# Patient Record
Sex: MALE | Race: BLACK OR AFRICAN AMERICAN | Employment: OTHER | URBAN - METROPOLITAN AREA
[De-identification: names, ages, dates, MRNs, and addresses within clinical notes are randomized per-mention and may not be internally consistent; named-entity substitution may affect disease eponyms.]

---

## 2023-03-15 ENCOUNTER — HOSPITAL ENCOUNTER (OUTPATIENT)
Dept: PREADMISSION TESTING | Age: 68
Discharge: HOME | End: 2023-03-15
Payer: MEDICARE

## 2023-03-15 VITALS
SYSTOLIC BLOOD PRESSURE: 142 MMHG | HEART RATE: 43 BPM | OXYGEN SATURATION: 100 % | WEIGHT: 204.8 LBS | RESPIRATION RATE: 16 BRPM | DIASTOLIC BLOOD PRESSURE: 78 MMHG | HEIGHT: 71 IN | BODY MASS INDEX: 28.67 KG/M2 | TEMPERATURE: 97.7 F

## 2023-03-15 LAB
ABSOLUTE EOS #: 0.47 K/UL (ref 0–0.44)
ABSOLUTE IMMATURE GRANULOCYTE: 0.02 K/UL (ref 0–0.3)
ABSOLUTE LYMPH #: 2.39 K/UL (ref 1.1–3.7)
ABSOLUTE MONO #: 1.03 K/UL (ref 0.1–1.2)
ANION GAP SERPL CALCULATED.3IONS-SCNC: 12 MMOL/L (ref 9–17)
BASOPHILS # BLD: 1 % (ref 0–2)
BASOPHILS ABSOLUTE: 0.06 K/UL (ref 0–0.2)
BILIRUBIN URINE: NEGATIVE
BUN SERPL-MCNC: 16 MG/DL (ref 8–23)
BUN/CREAT BLD: 11 (ref 9–20)
CALCIUM SERPL-MCNC: 9.3 MG/DL (ref 8.6–10.4)
CHLORIDE SERPL-SCNC: 100 MMOL/L (ref 98–107)
CO2 SERPL-SCNC: 28 MMOL/L (ref 20–31)
COLOR: YELLOW
COMMENT UA: NORMAL
CREAT SERPL-MCNC: 1.44 MG/DL (ref 0.7–1.2)
EOSINOPHILS RELATIVE PERCENT: 6 % (ref 1–4)
GFR SERPL CREATININE-BSD FRML MDRD: 53 ML/MIN/1.73M2
GLUCOSE SERPL-MCNC: 104 MG/DL (ref 70–99)
GLUCOSE UR STRIP.AUTO-MCNC: NEGATIVE MG/DL
HCT VFR BLD AUTO: 45.4 % (ref 40.7–50.3)
HGB BLD-MCNC: 15.1 G/DL (ref 13–17)
IMMATURE GRANULOCYTES: 0 %
INR PPP: 1
KETONES UR STRIP.AUTO-MCNC: NEGATIVE MG/DL
LEUKOCYTE ESTERASE UR QL STRIP.AUTO: NEGATIVE
LYMPHOCYTES # BLD: 30 % (ref 24–43)
MCH RBC QN AUTO: 26.8 PG (ref 25.2–33.5)
MCHC RBC AUTO-ENTMCNC: 33.3 G/DL (ref 28.4–34.8)
MCV RBC AUTO: 80.6 FL (ref 82.6–102.9)
MONOCYTES # BLD: 13 % (ref 3–12)
NITRITE UR QL STRIP.AUTO: NEGATIVE
NRBC AUTOMATED: 0 PER 100 WBC
PARTIAL THROMBOPLASTIN TIME: 28.7 SEC (ref 23.9–33.8)
PDW BLD-RTO: 13.4 % (ref 11.8–14.4)
PLATELET # BLD AUTO: 242 K/UL (ref 138–453)
PMV BLD AUTO: 10.3 FL (ref 8.1–13.5)
POTASSIUM SERPL-SCNC: 3.6 MMOL/L (ref 3.7–5.3)
PROT UR STRIP.AUTO-MCNC: 7 MG/DL (ref 5–8)
PROT UR STRIP.AUTO-MCNC: NEGATIVE MG/DL
PROTHROMBIN TIME: 13.2 SEC (ref 11.5–14.2)
RBC # BLD: 5.63 M/UL (ref 4.21–5.77)
RBC # BLD: ABNORMAL 10*6/UL
SEG NEUTROPHILS: 50 % (ref 36–65)
SEGMENTED NEUTROPHILS ABSOLUTE COUNT: 3.92 K/UL (ref 1.5–8.1)
SODIUM SERPL-SCNC: 140 MMOL/L (ref 135–144)
SPECIFIC GRAVITY UA: 1.01 (ref 1–1.03)
TURBIDITY: CLEAR
URINE HGB: NEGATIVE
UROBILINOGEN, URINE: NORMAL
WBC # BLD AUTO: 7.9 K/UL (ref 3.5–11.3)

## 2023-03-15 PROCEDURE — 85025 COMPLETE CBC W/AUTO DIFF WBC: CPT

## 2023-03-15 PROCEDURE — 81003 URINALYSIS AUTO W/O SCOPE: CPT

## 2023-03-15 PROCEDURE — 93005 ELECTROCARDIOGRAM TRACING: CPT | Performed by: ORTHOPAEDIC SURGERY

## 2023-03-15 PROCEDURE — 85610 PROTHROMBIN TIME: CPT

## 2023-03-15 PROCEDURE — 36415 COLL VENOUS BLD VENIPUNCTURE: CPT

## 2023-03-15 PROCEDURE — 85730 THROMBOPLASTIN TIME PARTIAL: CPT

## 2023-03-15 PROCEDURE — 80048 BASIC METABOLIC PNL TOTAL CA: CPT

## 2023-03-15 PROCEDURE — 87086 URINE CULTURE/COLONY COUNT: CPT

## 2023-03-15 PROCEDURE — 87641 MR-STAPH DNA AMP PROBE: CPT

## 2023-03-15 RX ORDER — M-VIT,TX,IRON,MINS/CALC/FOLIC 27MG-0.4MG
1 TABLET ORAL DAILY
COMMUNITY

## 2023-03-15 RX ORDER — OMEPRAZOLE 20 MG/1
20 CAPSULE, DELAYED RELEASE ORAL DAILY
COMMUNITY

## 2023-03-15 RX ORDER — ALLOPURINOL 100 MG/1
100 TABLET ORAL DAILY
COMMUNITY

## 2023-03-15 RX ORDER — LOSARTAN POTASSIUM 100 MG/1
100 TABLET ORAL DAILY
COMMUNITY

## 2023-03-15 RX ORDER — ICOSAPENT ETHYL 1000 MG/1
2 CAPSULE ORAL 2 TIMES DAILY
COMMUNITY

## 2023-03-15 RX ORDER — TIZANIDINE 4 MG/1
4 TABLET ORAL EVERY 6 HOURS PRN
COMMUNITY

## 2023-03-15 RX ORDER — CETIRIZINE HYDROCHLORIDE 10 MG/1
10 TABLET ORAL DAILY
COMMUNITY

## 2023-03-15 RX ORDER — SILDENAFIL CITRATE 20 MG/1
20 TABLET ORAL PRN
COMMUNITY

## 2023-03-15 RX ORDER — CHLORTHALIDONE 25 MG/1
25 TABLET ORAL DAILY
COMMUNITY

## 2023-03-15 ASSESSMENT — PAIN DESCRIPTION - DESCRIPTORS: DESCRIPTORS: ACHING

## 2023-03-15 ASSESSMENT — PAIN SCALES - GENERAL: PAINLEVEL_OUTOF10: 0

## 2023-03-15 ASSESSMENT — PAIN DESCRIPTION - LOCATION: LOCATION: BACK

## 2023-03-15 ASSESSMENT — PAIN DESCRIPTION - ORIENTATION: ORIENTATION: MID

## 2023-03-15 NOTE — PRE-PROCEDURE INSTRUCTIONS
[facility] Wednesday[facility] 4[facility]5/23 at 7:50 AM    Once you enter the hospital lobby, take the elevators to the second floor. Check-In is at the surgery registration desk. Continue to take your home medications as you normally do up to and including the night before surgery with the exception of any blood thinning medications. Please stop any blood thinning medications as directed by your surgeon or prescribing physician. Failure to stop certain medications may interfere with your scheduled surgery. These may include:  Aspirin, Warfarin (Coumadin), Clopidogrel (Plavix), Ibuprofen (Motrin, Advil), Naproxen (Aleve), Meloxicam (Mobic), Celecoxib (Celebrex), Eliquis, Pradaxa, Xarelto, Effient, Fish Oil, Herbal supplements. Stop MVI, may Take Tylenol for pain. If you are diabetic, do not take any of your diabetic medications by mouth the morning of surgery. If you are taking insulin contact the doctor that manages your diabetes for instructions about any changes to your insulin dosages the day before surgery. Do not inject insulin or other injectable diabetic medications the morning of surgery unless otherwise instructed by the doctor who manages your diabetes. Please take the following medication(s) the day of surgery with a small sip of water:  Omeprazole    Please use your inhalers at home the day of surgery. PREPARING FOR YOUR SURGERY:     Before surgery, you can play an important role in your own health. Because skin is not sterile, we need to be sure that your skin is as free of germs as possible before surgery by carefully washing before surgery. Preparing or prepping skin before surgery can reduce the risk of a surgical site infection.   Do not shave the area of your body where your surgery will be performed unless you received specific permission from your physician.     You will need to shower at home the night before surgery and the morning of surgery with a special soap called chlorhexidine gluconate (CHG*). *Not to be used by people allergic to Chlorhexidine Gluconate (CHG). Following these instructions will help you be sure that your skin is clean before surgery. Instructions on cleaning your skin before surgery: The night before your surgery: You will need to shower with warm water (not hot) and the CHG soap. Use a clean wash cloth and a clean towel. Have clean clothes available to put on after the shower. First wash your hair with regular shampoo. Rinse your hair and body thoroughly to remove the shampoo. Wash your face and genital area (private parts) with your regular soap or water only. Thoroughly rinse your body with warm water from the neck down. Turn water off to prevent rinsing the soap off too soon. With a clean wet washcloth and half of the CHG soap in the bottle, lather your entire body from the neck down. Do not use CHG soap near your eyes or ears to avoid injury to those areas. Wash thoroughly, paying special attention to the area where your surgery will be performed. Wash your body gently for five (5) minutes. Avoid scrubbing your skin too hard. Turn the water back on and rinse your body thoroughly. Pat yourself dry with a clean, soft towel. Do not apply lotion, cream or powder. Dress with clean freshly washed clothes. The morning of surgery:    Repeat shower following steps above - using remaining half of CHG soap in bottle. Patient Instructions: If you are having any type of anesthesia you are to have nothing to eat or drink after midnight the night before your surgery. This includes gum, hard candy, mints, water or smoking or chewing tobacco.  The only exception to this is a small sip of water to take with any morning dose of heart, blood pressure, or seizure medications. No alcoholic beverages for 24 hours prior to surgery. Brush your teeth but do not swallow water.     Bring your eyeglasses and case with you. No contacts are to be worn the day of surgery. You also may bring your hearing aids. If you are on C-PAP or Bi-PAP at home and plan on staying in the hospital overnight for your surgery please bring the machine with you. Do not wear any jewelry or body piercings day of surgery. Also, NO lotion, perfume or deodorant to be used the day of surgery. No nail polish on the operative extremity (arm/leg surgeries)     If you are staying overnight with us, please bring a small bag of personal items. Please wear loose, comfortable clothing. If you are potentially going to have a cast or brace bring clothing that will fit over them. In case of illness - If you have cold or flu like symptoms (high fever, runny nose, sore throat, cough, etc.) rash, nausea, vomiting, loose stools, and/or recent contact with someone who has a contagious disease (chicken pox, measles, etc.) Please call your doctor before coming to the hospital.    If your child is having surgery please make arrangements for any other children to be cared for at home on the day of surgery. Other children are not permitted in recovery room and we want you to be able to spend time with the patient. If other arrangements are not available then we suggest that you have a second adult to stay in the waiting room. Day of Surgery/Procedure:    As a patient at [facility] - INPATIENT you can expect quality medical and nursing care that is centered on your individual needs. Our goal is to make your surgical experience as comfortable as possible    . Transportation After Your Surgery/Procedure: You will need a friend or family member to drive you home after your procedure. Your  must be 25years of age or older and able to sign off on your discharge instructions.   A taxi cab or any other form of public transportation is not acceptable. Your friend or family member must stay at the hospital throughout your procedure. Someone must remain with you for the first 24 hours after your surgery if you receive anesthesia or medication. If you do not have someone to stay with you, your procedure may be cancelled.       If you have any other questions regarding your procedure or the day of surgery, please call [phone]      _________________________  ____________________________  Signature (Patient)              Signature (Provider) & date

## 2023-03-15 NOTE — PROGRESS NOTES
PAT Progress Note    Pt Name: James Wilson  MRN: [id number]  YOB: 1955  Date of evaluation: 3/15/2023      [x] Called to PAT. I spoke to the patient, James Wilson, a 75 y.o. male, who is scheduled for an upcoming LEFT L3-L5 LUMBAR TRANSFORMINAL INTERBODY FUSION POSTERIOR TWO LEVELS - [facility]   2 C-ARMS  POSSIBLE COMPUTER NAVIGATION by Suzanne Jones MD for Spinal stenosis of lumbar region, unspecified whether neurogenic claudication present John Rodrigues on 04/05/2023 at 0950. [x] The patient has an upcoming PCP appointment on 03/16/2023, which can be used for an Interval History and Physical Note the day of surgery. Patient follows with Dr. Frederick Smith nephrology for management of chronic kidney disease and was last seen in November 2022. Also sees Dr. Anderson Rogers and was seen at the beginning of this month. Functional Capacity per pt:  1) Pt is able to walk 2 city blocks on level ground without SOB. 2) Pt is able to climb 2 flights of stairs without SOB. 3) Pt is able to walk up a hill for 1-2 city blocks without SOB. Patient used to run regularly but has had to cut down due to back pain. He is still active and jogs when he can as well as frequent walks. Vital signs: BP (!) 142/78   Pulse (!) 43   Temp 97.7 °F (36.5 °C) (Temporal)   Resp 16   Ht 5' 11\" (1.803 m)   Wt 204 lb 12.8 oz (92.9 kg)   SpO2 100%   BMI 28.56 kg/m²     Physical Exam:     General Appearance:  Alert, well appearing, and in no acute distress. Mental status: Oriented to person, place, and time. Lungs: Bilateral equal air entry, clear to auscultation, no wheezing, rales or rhonchi, and normal effort. Cardiovascular: Asymptomatic bradycardic rate. Apical pulse 52 bpm. Regular rhythm, no murmur, gallop, or rub. Abdomen: Soft, nontender, nondistended, and active bowel sounds.     Past Medical History:     Past Medical History:   Diagnosis Date    Arthritis     Chronic kidney disease (CKD)     follows with [facility] clinic Dr. Maryam Gonzáles.     GERD (gastroesophageal reflux disease)     Gout     Hyperlipidemia     Hypertension         Past Surgical History:     Past Surgical History:   Procedure Laterality Date    ACHILLES TENDON SURGERY Left     COLONOSCOPY  2022    HERNIA REPAIR Left     KNEE ARTHROSCOPY Left     KNEE ARTHROSCOPY Left     KNEE ARTHROSCOPY Right     SHOULDER ARTHROSCOPY W/ ROTATOR CUFF REPAIR Right     SHOULDER ARTHROSCOPY W/ ROTATOR CUFF REPAIR Left     TONSILLECTOMY         Investigations:      Laboratory Testing:  Recent Results (from the past 24 hour(s))   CBC with Auto Differential    Collection Time: 03/15/23  8:45 AM   Result Value Ref Range    WBC 7.9 3.5 - 11.3 k/uL    RBC 5.63 4.21 - 5.77 m/uL    Hemoglobin 15.1 13.0 - 17.0 g/dL    Hematocrit 45.4 40.7 - 50.3 %    MCV 80.6 (L) 82.6 - 102.9 fL    MCH 26.8 25.2 - 33.5 pg    MCHC 33.3 28.4 - 34.8 g/dL    RDW 13.4 11.8 - 14.4 %    Platelets 515 884 - 438 k/uL    MPV 10.3 8.1 - 13.5 fL    NRBC Automated 0.0 0.0 per 100 WBC    Seg Neutrophils 50 36 - 65 %    Lymphocytes 30 24 - 43 %    Monocytes 13 (H) 3 - 12 %    Eosinophils % 6 (H) 1 - 4 %    Basophils 1 0 - 2 %    Immature Granulocytes 0 0 %    Segs Absolute 3.92 1.50 - 8.10 k/uL    Absolute Lymph # 2.39 1.10 - 3.70 k/uL    Absolute Mono # 1.03 0.10 - 1.20 k/uL    Absolute Eos # 0.47 (H) 0.00 - 0.44 k/uL    Basophils Absolute 0.06 0.00 - 0.20 k/uL    Absolute Immature Granulocyte 0.02 0.00 - 0.30 k/uL    RBC Morphology MICROCYTOSIS PRESENT    Protime-INR    Collection Time: 03/15/23  8:45 AM   Result Value Ref Range    Protime 13.2 11.5 - 14.2 sec    INR 1.0    APTT    Collection Time: 03/15/23  8:45 AM   Result Value Ref Range    PTT 28.7 23.9 - 33.8 sec   Basic Metabolic Panel    Collection Time: 03/15/23  8:45 AM   Result Value Ref Range    Glucose 104 (H) 70 - 99 mg/dL    BUN 16 8 - 23 mg/dL    Creatinine 1.44 (H) 0.70 - 1.20 mg/dL    Est, Glom Filt Rate 53 (L) >60 mL/min/1.73m2    Bun/Cre Ratio 11 9 - 20    Calcium 9.3 8.6 - 10.4 mg/dL    Sodium 140 135 - 144 mmol/L    Potassium 3.6 (L) 3.7 - 5.3 mmol/L    Chloride 100 98 - 107 mmol/L    CO2 28 20 - 31 mmol/L    Anion Gap 12 9 - 17 mmol/L   Urinalysis    Collection Time: 03/15/23  8:56 AM   Result Value Ref Range    Color, UA Yellow Yellow    Turbidity UA Clear Clear    Glucose, Ur NEGATIVE NEGATIVE    Bilirubin Urine NEGATIVE NEGATIVE    Ketones, Urine NEGATIVE NEGATIVE    Specific Gravity, UA 1.007 1.005 - 1.030    Urine Hgb NEGATIVE NEGATIVE    pH, UA 7.0 5.0 - 8.0    Protein, UA NEGATIVE NEGATIVE    Urobilinogen, Urine Normal Normal    Nitrite, Urine NEGATIVE NEGATIVE    Leukocyte Esterase, Urine NEGATIVE NEGATIVE    Urinalysis Comments       Microscopic exam not performed based on chemical results unless requested in original order. Recent Labs     03/15/23  0845   HGB 15.1   HCT 45.4   WBC 7.9   MCV 80.6*      K 3.6*      CO2 28   BUN 16   CREATININE 1.44*   GLUCOSE 104*   INR 1.0   PROTIME 13.2   APTT 28.7       No results for input(s): COVID19 in the last 720 hours.     MARYCRUZ Spivey CNP    Electronically signed 3/15/2023 at 9:45 AM

## 2023-03-16 LAB
EKG ATRIAL RATE: 54 BPM
EKG P AXIS: 36 DEGREES
EKG P-R INTERVAL: 198 MS
EKG Q-T INTERVAL: 474 MS
EKG QRS DURATION: 96 MS
EKG QTC CALCULATION (BAZETT): 449 MS
EKG R AXIS: -24 DEGREES
EKG T AXIS: 20 DEGREES
EKG VENTRICULAR RATE: 54 BPM
MICROORGANISM SPEC CULT: NO GROWTH
MRSA, DNA, NASAL: NEGATIVE
SPECIMEN DESCRIPTION: NORMAL
SPECIMEN DESCRIPTION: NORMAL

## 2023-03-16 PROCEDURE — 93010 ELECTROCARDIOGRAM REPORT: CPT | Performed by: INTERNAL MEDICINE

## 2023-04-04 NOTE — DISCHARGE INSTRUCTIONS
No bending or twisting back for 12 weeks  No lifting over 15 pounds for 12 weeks  Dry dressing changes daily x 1wk  Brace on when up walking. Ok to have off sitting and in bed. No NSAIDS x 6wks  Stiches are dissolvable and do not need to be removed  Call for any fevers, wound redness, swelling or drainage after 4 days [phone]  May shower in 2 days      MD [facility]  Spine Surgeon  [phone]    Keep it Clean - [facility] instructions    These instructions are to help you have the best possible recovery after your surgical procedure. Becky Sotelo is here to support you. If you have questions, call [phone] Monday through Friday from 7:30AM to 8:30PM to speak to a nurse. If you need to speak to someone outside of these hours, call your physician. Incision Dos and Donts  Do wash hands before and after dressing changes or when you have had any contact with your incision. Use hand  or antibacterial soap. Do keep your incision clean and dry. Its OK to wash the skin around your incision with mild soap and water. Do change your dressing as you were told. Do notify your doctor if the dressing becomes wet or dirty. Do use a clean washcloth every time when cleaning your incision. Do sleep on clean linens. Do keep pets away from incision site. Dont sit in a bathtub, pool, or hot tub until your incision is fully closed and any drains are removed. Dont scrub, pick, scratch, or pull at your incision. Dont use oils, lotions, or creams on your incision unless your healthcare provider approves it. Follow-up  You will have one or more follow-up visits with your healthcare provider. These are needed to check how well youre healing. Your drain, stitches, or staples may also be removed during these visits. Do not miss your follow-up visit, even if you are feeling better.       Call your healthcare provider right away if you have the following:  Fever

## 2023-04-05 ENCOUNTER — HOSPITAL ENCOUNTER (INPATIENT)
Age: 68
LOS: 1 days | Discharge: HOME | End: 2023-04-06
Attending: ORTHOPAEDIC SURGERY | Admitting: ORTHOPAEDIC SURGERY
Payer: MEDICARE

## 2023-04-05 ENCOUNTER — ANESTHESIA EVENT (OUTPATIENT)
Dept: OPERATING ROOM | Age: 68
End: 2023-04-05
Payer: MEDICARE

## 2023-04-05 ENCOUNTER — ANESTHESIA (OUTPATIENT)
Dept: OPERATING ROOM | Age: 68
End: 2023-04-05
Payer: MEDICARE

## 2023-04-05 ENCOUNTER — APPOINTMENT (OUTPATIENT)
Dept: GENERAL RADIOLOGY | Age: 68
End: 2023-04-05
Attending: ORTHOPAEDIC SURGERY
Payer: MEDICARE

## 2023-04-05 DIAGNOSIS — M43.10 SPONDYLOLISTHESIS, ACQUIRED: Primary | Chronic | ICD-10-CM

## 2023-04-05 PROBLEM — M48.062 SPINAL STENOSIS, LUMBAR REGION WITH NEUROGENIC CLAUDICATION: Chronic | Status: ACTIVE | Noted: 2023-04-05

## 2023-04-05 PROCEDURE — 6370000000 HC RX 637 (ALT 250 FOR IP): Performed by: ORTHOPAEDIC SURGERY

## 2023-04-05 PROCEDURE — 2580000003 HC RX 258: Performed by: ANESTHESIOLOGY

## 2023-04-05 PROCEDURE — 7100000001 HC PACU RECOVERY - ADDTL 15 MIN: Performed by: ORTHOPAEDIC SURGERY

## 2023-04-05 PROCEDURE — C1713 ANCHOR/SCREW BN/BN,TIS/BN: HCPCS | Performed by: ORTHOPAEDIC SURGERY

## 2023-04-05 PROCEDURE — 3600000004 HC SURGERY LEVEL 4 BASE: Performed by: ORTHOPAEDIC SURGERY

## 2023-04-05 PROCEDURE — 3700000001 HC ADD 15 MINUTES (ANESTHESIA): Performed by: ORTHOPAEDIC SURGERY

## 2023-04-05 PROCEDURE — 6360000002 HC RX W HCPCS: Performed by: NURSE ANESTHETIST, CERTIFIED REGISTERED

## 2023-04-05 PROCEDURE — 3600000014 HC SURGERY LEVEL 4 ADDTL 15MIN: Performed by: ORTHOPAEDIC SURGERY

## 2023-04-05 PROCEDURE — 3209999900 FLUORO FOR SURGICAL PROCEDURES

## 2023-04-05 PROCEDURE — 3700000000 HC ANESTHESIA ATTENDED CARE: Performed by: ORTHOPAEDIC SURGERY

## 2023-04-05 PROCEDURE — A4217 STERILE WATER/SALINE, 500 ML: HCPCS | Performed by: ORTHOPAEDIC SURGERY

## 2023-04-05 PROCEDURE — 2500000003 HC RX 250 WO HCPCS: Performed by: NURSE ANESTHETIST, CERTIFIED REGISTERED

## 2023-04-05 PROCEDURE — 2720000010 HC SURG SUPPLY STERILE: Performed by: ORTHOPAEDIC SURGERY

## 2023-04-05 PROCEDURE — 2580000003 HC RX 258: Performed by: ORTHOPAEDIC SURGERY

## 2023-04-05 PROCEDURE — 2500000003 HC RX 250 WO HCPCS: Performed by: ORTHOPAEDIC SURGERY

## 2023-04-05 PROCEDURE — 6360000002 HC RX W HCPCS: Performed by: ANESTHESIOLOGY

## 2023-04-05 PROCEDURE — 1200000000 HC SEMI PRIVATE

## 2023-04-05 PROCEDURE — 6370000000 HC RX 637 (ALT 250 FOR IP): Performed by: ANESTHESIOLOGY

## 2023-04-05 PROCEDURE — C1889 IMPLANT/INSERT DEVICE, NOC: HCPCS | Performed by: ORTHOPAEDIC SURGERY

## 2023-04-05 PROCEDURE — 7100000000 HC PACU RECOVERY - FIRST 15 MIN: Performed by: ORTHOPAEDIC SURGERY

## 2023-04-05 PROCEDURE — 2709999900 HC NON-CHARGEABLE SUPPLY: Performed by: ORTHOPAEDIC SURGERY

## 2023-04-05 PROCEDURE — 6360000002 HC RX W HCPCS: Performed by: ORTHOPAEDIC SURGERY

## 2023-04-05 DEVICE — SCREW SPNL L45MM DIA6.5MM POLYAX 2C RELINE MAS: Type: IMPLANTABLE DEVICE | Site: BACK | Status: FUNCTIONAL

## 2023-04-05 DEVICE — SCREW SPNL MOD TULIP RELINE: Type: IMPLANTABLE DEVICE | Site: BACK | Status: FUNCTIONAL

## 2023-04-05 DEVICE — SCREW SPNL DIA5.5MM OPN TULIP LOK RELINE: Type: IMPLANTABLE DEVICE | Site: BACK | Status: FUNCTIONAL

## 2023-04-05 DEVICE — GRAFT BNE 30CC 1 4MM CANC CRUSH CHIP READIGRFT: Type: IMPLANTABLE DEVICE | Site: BACK | Status: FUNCTIONAL

## 2023-04-05 DEVICE — GRAFT BNE PTTY LG DEGENERATIVE BNE MTRX PROPEL: Type: IMPLANTABLE DEVICE | Site: BACK | Status: FUNCTIONAL

## 2023-04-05 DEVICE — ROD SPNL L70MM DIA5.5MM POST THORACOLUMBOSACRAL TI LORD: Type: IMPLANTABLE DEVICE | Site: BACK | Status: FUNCTIONAL

## 2023-04-05 DEVICE — CAGE SPNL 4 DEG 30X10X10 MM TLIF-O MODULUS: Type: IMPLANTABLE DEVICE | Site: BACK | Status: FUNCTIONAL

## 2023-04-05 DEVICE — SCREW SPNL L45MM DIA6.5MM POST THORACOLUMBOSACRAL MOD SHANK: Type: IMPLANTABLE DEVICE | Site: BACK | Status: FUNCTIONAL

## 2023-04-05 RX ORDER — SODIUM CHLORIDE, SODIUM LACTATE, POTASSIUM CHLORIDE, CALCIUM CHLORIDE 600; 310; 30; 20 MG/100ML; MG/100ML; MG/100ML; MG/100ML
INJECTION, SOLUTION INTRAVENOUS CONTINUOUS
Status: DISCONTINUED | OUTPATIENT
Start: 2023-04-05 | End: 2023-04-05

## 2023-04-05 RX ORDER — SODIUM CHLORIDE 0.9 % (FLUSH) 0.9 %
5-40 SYRINGE (ML) INJECTION EVERY 12 HOURS SCHEDULED
Status: DISCONTINUED | OUTPATIENT
Start: 2023-04-05 | End: 2023-04-05 | Stop reason: HOSPADM

## 2023-04-05 RX ORDER — SODIUM CHLORIDE 0.9 % (FLUSH) 0.9 %
5-40 SYRINGE (ML) INJECTION PRN
Status: DISCONTINUED | OUTPATIENT
Start: 2023-04-05 | End: 2023-04-05 | Stop reason: HOSPADM

## 2023-04-05 RX ORDER — SODIUM CHLORIDE 0.9 % (FLUSH) 0.9 %
5-40 SYRINGE (ML) INJECTION EVERY 12 HOURS SCHEDULED
Status: DISCONTINUED | OUTPATIENT
Start: 2023-04-05 | End: 2023-04-06 | Stop reason: HOSPADM

## 2023-04-05 RX ORDER — SENNA AND DOCUSATE SODIUM 50; 8.6 MG/1; MG/1
1 TABLET, FILM COATED ORAL 2 TIMES DAILY
Status: DISCONTINUED | OUTPATIENT
Start: 2023-04-05 | End: 2023-04-06 | Stop reason: HOSPADM

## 2023-04-05 RX ORDER — SODIUM CHLORIDE 0.9 % (FLUSH) 0.9 %
5-40 SYRINGE (ML) INJECTION PRN
Status: DISCONTINUED | OUTPATIENT
Start: 2023-04-05 | End: 2023-04-06 | Stop reason: HOSPADM

## 2023-04-05 RX ORDER — PROPOFOL 10 MG/ML
INJECTION, EMULSION INTRAVENOUS PRN
Status: DISCONTINUED | OUTPATIENT
Start: 2023-04-05 | End: 2023-04-05 | Stop reason: SDUPTHER

## 2023-04-05 RX ORDER — DEXAMETHASONE SODIUM PHOSPHATE 10 MG/ML
INJECTION, SOLUTION INTRAMUSCULAR; INTRAVENOUS PRN
Status: DISCONTINUED | OUTPATIENT
Start: 2023-04-05 | End: 2023-04-05 | Stop reason: SDUPTHER

## 2023-04-05 RX ORDER — LIDOCAINE HYDROCHLORIDE 10 MG/ML
1 INJECTION, SOLUTION EPIDURAL; INFILTRATION; INTRACAUDAL; PERINEURAL
Status: DISCONTINUED | OUTPATIENT
Start: 2023-04-05 | End: 2023-04-05 | Stop reason: HOSPADM

## 2023-04-05 RX ORDER — SODIUM CHLORIDE 9 MG/ML
INJECTION, SOLUTION INTRAVENOUS PRN
Status: DISCONTINUED | OUTPATIENT
Start: 2023-04-05 | End: 2023-04-06 | Stop reason: HOSPADM

## 2023-04-05 RX ORDER — ONDANSETRON 2 MG/ML
4 INJECTION INTRAMUSCULAR; INTRAVENOUS EVERY 6 HOURS PRN
Status: DISCONTINUED | OUTPATIENT
Start: 2023-04-05 | End: 2023-04-06 | Stop reason: HOSPADM

## 2023-04-05 RX ORDER — MORPHINE SULFATE 15 MG/1
15 TABLET, FILM COATED, EXTENDED RELEASE ORAL EVERY 12 HOURS SCHEDULED
Status: DISCONTINUED | OUTPATIENT
Start: 2023-04-05 | End: 2023-04-06 | Stop reason: HOSPADM

## 2023-04-05 RX ORDER — SUCCINYLCHOLINE/SOD CL,ISO/PF 100 MG/5ML
SYRINGE (ML) INTRAVENOUS PRN
Status: DISCONTINUED | OUTPATIENT
Start: 2023-04-05 | End: 2023-04-05 | Stop reason: SDUPTHER

## 2023-04-05 RX ORDER — ONDANSETRON 2 MG/ML
4 INJECTION INTRAMUSCULAR; INTRAVENOUS
Status: DISCONTINUED | OUTPATIENT
Start: 2023-04-05 | End: 2023-04-05 | Stop reason: HOSPADM

## 2023-04-05 RX ORDER — SODIUM CHLORIDE 9 MG/ML
INJECTION, SOLUTION INTRAVENOUS CONTINUOUS
Status: DISCONTINUED | OUTPATIENT
Start: 2023-04-05 | End: 2023-04-06 | Stop reason: HOSPADM

## 2023-04-05 RX ORDER — ALLOPURINOL 100 MG/1
100 TABLET ORAL DAILY
Status: DISCONTINUED | OUTPATIENT
Start: 2023-04-05 | End: 2023-04-06 | Stop reason: HOSPADM

## 2023-04-05 RX ORDER — HYDROMORPHONE HCL 110MG/55ML
PATIENT CONTROLLED ANALGESIA SYRINGE INTRAVENOUS PRN
Status: DISCONTINUED | OUTPATIENT
Start: 2023-04-05 | End: 2023-04-05 | Stop reason: SDUPTHER

## 2023-04-05 RX ORDER — LOSARTAN POTASSIUM 100 MG/1
100 TABLET ORAL DAILY
Status: DISCONTINUED | OUTPATIENT
Start: 2023-04-06 | End: 2023-04-06 | Stop reason: HOSPADM

## 2023-04-05 RX ORDER — PROMETHAZINE HYDROCHLORIDE 12.5 MG/1
12.5 TABLET ORAL EVERY 6 HOURS PRN
Status: DISCONTINUED | OUTPATIENT
Start: 2023-04-05 | End: 2023-04-06 | Stop reason: HOSPADM

## 2023-04-05 RX ORDER — SODIUM CHLORIDE 9 MG/ML
INJECTION, SOLUTION INTRAVENOUS CONTINUOUS
Status: DISCONTINUED | OUTPATIENT
Start: 2023-04-05 | End: 2023-04-05

## 2023-04-05 RX ORDER — KETAMINE HCL IN NACL, ISO-OSM 100MG/10ML
SYRINGE (ML) INJECTION PRN
Status: DISCONTINUED | OUTPATIENT
Start: 2023-04-05 | End: 2023-04-05 | Stop reason: SDUPTHER

## 2023-04-05 RX ORDER — LIDOCAINE HYDROCHLORIDE 20 MG/ML
INJECTION, SOLUTION EPIDURAL; INFILTRATION; INTRACAUDAL; PERINEURAL PRN
Status: DISCONTINUED | OUTPATIENT
Start: 2023-04-05 | End: 2023-04-05 | Stop reason: SDUPTHER

## 2023-04-05 RX ORDER — FENTANYL CITRATE 50 UG/ML
25 INJECTION, SOLUTION INTRAMUSCULAR; INTRAVENOUS EVERY 5 MIN PRN
Status: DISCONTINUED | OUTPATIENT
Start: 2023-04-05 | End: 2023-04-05 | Stop reason: HOSPADM

## 2023-04-05 RX ORDER — MIDAZOLAM HYDROCHLORIDE 1 MG/ML
INJECTION INTRAMUSCULAR; INTRAVENOUS PRN
Status: DISCONTINUED | OUTPATIENT
Start: 2023-04-05 | End: 2023-04-05 | Stop reason: SDUPTHER

## 2023-04-05 RX ORDER — SODIUM CHLORIDE 9 MG/ML
INJECTION, SOLUTION INTRAVENOUS PRN
Status: DISCONTINUED | OUTPATIENT
Start: 2023-04-05 | End: 2023-04-05 | Stop reason: HOSPADM

## 2023-04-05 RX ORDER — TIZANIDINE 4 MG/1
4 TABLET ORAL EVERY 8 HOURS PRN
Status: DISCONTINUED | OUTPATIENT
Start: 2023-04-05 | End: 2023-04-06 | Stop reason: HOSPADM

## 2023-04-05 RX ORDER — PANTOPRAZOLE SODIUM 40 MG/1
40 TABLET, DELAYED RELEASE ORAL
Status: DISCONTINUED | OUTPATIENT
Start: 2023-04-06 | End: 2023-04-06 | Stop reason: HOSPADM

## 2023-04-05 RX ORDER — DEXAMETHASONE SODIUM PHOSPHATE 10 MG/ML
6 INJECTION, SOLUTION INTRAMUSCULAR; INTRAVENOUS EVERY 8 HOURS
Status: COMPLETED | OUTPATIENT
Start: 2023-04-05 | End: 2023-04-06

## 2023-04-05 RX ORDER — HYDROMORPHONE HYDROCHLORIDE 1 MG/ML
0.5 INJECTION, SOLUTION INTRAMUSCULAR; INTRAVENOUS; SUBCUTANEOUS EVERY 5 MIN PRN
Status: DISCONTINUED | OUTPATIENT
Start: 2023-04-05 | End: 2023-04-05 | Stop reason: HOSPADM

## 2023-04-05 RX ORDER — ONDANSETRON 2 MG/ML
INJECTION INTRAMUSCULAR; INTRAVENOUS PRN
Status: DISCONTINUED | OUTPATIENT
Start: 2023-04-05 | End: 2023-04-05 | Stop reason: SDUPTHER

## 2023-04-05 RX ORDER — HYDROXYZINE HYDROCHLORIDE 10 MG/1
10 TABLET, FILM COATED ORAL EVERY 8 HOURS PRN
Status: DISCONTINUED | OUTPATIENT
Start: 2023-04-05 | End: 2023-04-06 | Stop reason: HOSPADM

## 2023-04-05 RX ORDER — CETIRIZINE HYDROCHLORIDE 10 MG/1
10 TABLET ORAL DAILY
Status: DISCONTINUED | OUTPATIENT
Start: 2023-04-05 | End: 2023-04-06 | Stop reason: HOSPADM

## 2023-04-05 RX ORDER — CHLORTHALIDONE 25 MG/1
25 TABLET ORAL DAILY
Status: DISCONTINUED | OUTPATIENT
Start: 2023-04-05 | End: 2023-04-06 | Stop reason: HOSPADM

## 2023-04-05 RX ORDER — FENTANYL CITRATE 50 UG/ML
INJECTION, SOLUTION INTRAMUSCULAR; INTRAVENOUS PRN
Status: DISCONTINUED | OUTPATIENT
Start: 2023-04-05 | End: 2023-04-05 | Stop reason: SDUPTHER

## 2023-04-05 RX ORDER — OXYCODONE HYDROCHLORIDE AND ACETAMINOPHEN 5; 325 MG/1; MG/1
2 TABLET ORAL EVERY 4 HOURS PRN
Status: DISCONTINUED | OUTPATIENT
Start: 2023-04-05 | End: 2023-04-06 | Stop reason: HOSPADM

## 2023-04-05 RX ORDER — ACETAMINOPHEN 500 MG
1000 TABLET ORAL ONCE
Status: COMPLETED | OUTPATIENT
Start: 2023-04-05 | End: 2023-04-05

## 2023-04-05 RX ORDER — BUPIVACAINE HYDROCHLORIDE AND EPINEPHRINE 5; 5 MG/ML; UG/ML
INJECTION, SOLUTION EPIDURAL; INTRACAUDAL; PERINEURAL PRN
Status: DISCONTINUED | OUTPATIENT
Start: 2023-04-05 | End: 2023-04-05 | Stop reason: ALTCHOICE

## 2023-04-05 RX ORDER — MORPHINE SULFATE 2 MG/ML
2 INJECTION, SOLUTION INTRAMUSCULAR; INTRAVENOUS
Status: DISCONTINUED | OUTPATIENT
Start: 2023-04-05 | End: 2023-04-06 | Stop reason: HOSPADM

## 2023-04-05 RX ORDER — OXYCODONE HYDROCHLORIDE AND ACETAMINOPHEN 5; 325 MG/1; MG/1
1 TABLET ORAL EVERY 4 HOURS PRN
Status: DISCONTINUED | OUTPATIENT
Start: 2023-04-05 | End: 2023-04-06 | Stop reason: HOSPADM

## 2023-04-05 RX ORDER — MORPHINE SULFATE 4 MG/ML
4 INJECTION, SOLUTION INTRAMUSCULAR; INTRAVENOUS
Status: DISCONTINUED | OUTPATIENT
Start: 2023-04-05 | End: 2023-04-06 | Stop reason: HOSPADM

## 2023-04-05 RX ORDER — POLYETHYLENE GLYCOL 3350 17 G/17G
17 POWDER, FOR SOLUTION ORAL DAILY
Status: DISCONTINUED | OUTPATIENT
Start: 2023-04-05 | End: 2023-04-06 | Stop reason: HOSPADM

## 2023-04-05 RX ADMIN — HYDROMORPHONE HYDROCHLORIDE 0.5 MG: 1 INJECTION, SOLUTION INTRAMUSCULAR; INTRAVENOUS; SUBCUTANEOUS at 16:24

## 2023-04-05 RX ADMIN — ALLOPURINOL 100 MG: 100 TABLET ORAL at 17:50

## 2023-04-05 RX ADMIN — HYDROMORPHONE HYDROCHLORIDE 0.5 MG: 1 INJECTION, SOLUTION INTRAMUSCULAR; INTRAVENOUS; SUBCUTANEOUS at 16:31

## 2023-04-05 RX ADMIN — Medication 30 MG: at 11:01

## 2023-04-05 RX ADMIN — Medication 2000 MG: at 15:21

## 2023-04-05 RX ADMIN — Medication 2000 MG: at 23:23

## 2023-04-05 RX ADMIN — MIDAZOLAM 2 MG: 1 INJECTION INTRAMUSCULAR; INTRAVENOUS at 10:55

## 2023-04-05 RX ADMIN — ONDANSETRON 4 MG: 2 INJECTION INTRAMUSCULAR; INTRAVENOUS at 15:06

## 2023-04-05 RX ADMIN — HYDROMORPHONE HYDROCHLORIDE 0.4 MG: 2 INJECTION, SOLUTION INTRAMUSCULAR; INTRAVENOUS; SUBCUTANEOUS at 12:23

## 2023-04-05 RX ADMIN — Medication 100 MG: at 10:57

## 2023-04-05 RX ADMIN — SODIUM CHLORIDE, POTASSIUM CHLORIDE, SODIUM LACTATE AND CALCIUM CHLORIDE: 600; 310; 30; 20 INJECTION, SOLUTION INTRAVENOUS at 08:51

## 2023-04-05 RX ADMIN — HYDROMORPHONE HYDROCHLORIDE 0.4 MG: 2 INJECTION, SOLUTION INTRAMUSCULAR; INTRAVENOUS; SUBCUTANEOUS at 13:56

## 2023-04-05 RX ADMIN — CETIRIZINE HYDROCHLORIDE 10 MG: 10 TABLET, FILM COATED ORAL at 17:50

## 2023-04-05 RX ADMIN — Medication 2000 MG: at 11:21

## 2023-04-05 RX ADMIN — SODIUM CHLORIDE: 9 INJECTION, SOLUTION INTRAVENOUS at 17:38

## 2023-04-05 RX ADMIN — MORPHINE SULFATE 15 MG: 15 TABLET, FILM COATED, EXTENDED RELEASE ORAL at 17:50

## 2023-04-05 RX ADMIN — PROPOFOL 200 MG: 10 INJECTION, EMULSION INTRAVENOUS at 10:57

## 2023-04-05 RX ADMIN — LIDOCAINE HYDROCHLORIDE 80 MG: 20 INJECTION, SOLUTION EPIDURAL; INFILTRATION; INTRACAUDAL; PERINEURAL at 10:57

## 2023-04-05 RX ADMIN — POLYETHYLENE GLYCOL 3350 17 G: 17 POWDER, FOR SOLUTION ORAL at 17:46

## 2023-04-05 RX ADMIN — DOCUSATE SODIUM 50 MG AND SENNOSIDES 8.6 MG 1 TABLET: 8.6; 5 TABLET, FILM COATED ORAL at 19:50

## 2023-04-05 RX ADMIN — OXYCODONE AND ACETAMINOPHEN 1 TABLET: 5; 325 TABLET ORAL at 23:18

## 2023-04-05 RX ADMIN — SODIUM CHLORIDE: 9 INJECTION, SOLUTION INTRAVENOUS at 23:18

## 2023-04-05 RX ADMIN — FENTANYL CITRATE 100 MCG: 50 INJECTION INTRAMUSCULAR; INTRAVENOUS at 10:57

## 2023-04-05 RX ADMIN — FENTANYL CITRATE 25 MCG: 50 INJECTION, SOLUTION INTRAMUSCULAR; INTRAVENOUS at 16:45

## 2023-04-05 RX ADMIN — ACETAMINOPHEN 1000 MG: 500 TABLET ORAL at 09:16

## 2023-04-05 RX ADMIN — SODIUM CHLORIDE, POTASSIUM CHLORIDE, SODIUM LACTATE AND CALCIUM CHLORIDE: 600; 310; 30; 20 INJECTION, SOLUTION INTRAVENOUS at 13:43

## 2023-04-05 RX ADMIN — Medication 20 MG: at 11:27

## 2023-04-05 RX ADMIN — DEXAMETHASONE SODIUM PHOSPHATE 10 MG: 10 INJECTION INTRAMUSCULAR; INTRAVENOUS at 11:27

## 2023-04-05 RX ADMIN — DEXAMETHASONE SODIUM PHOSPHATE 6 MG: 10 INJECTION, SOLUTION INTRAMUSCULAR; INTRAVENOUS at 19:49

## 2023-04-05 ASSESSMENT — PAIN DESCRIPTION - LOCATION
LOCATION: BACK

## 2023-04-05 ASSESSMENT — PAIN SCALES - GENERAL
PAINLEVEL_OUTOF10: 3
PAINLEVEL_OUTOF10: 5
PAINLEVEL_OUTOF10: 7
PAINLEVEL_OUTOF10: 7

## 2023-04-05 ASSESSMENT — PAIN DESCRIPTION - ORIENTATION
ORIENTATION: LOWER
ORIENTATION: LOWER
ORIENTATION: MID;LOWER

## 2023-04-05 ASSESSMENT — PAIN DESCRIPTION - DESCRIPTORS
DESCRIPTORS: SORE;ACHING
DESCRIPTORS: ACHING;SORE
DESCRIPTORS: ACHING
DESCRIPTORS: ACHING;SORE

## 2023-04-05 ASSESSMENT — PAIN - FUNCTIONAL ASSESSMENT
PAIN_FUNCTIONAL_ASSESSMENT: 0-10
PAIN_FUNCTIONAL_ASSESSMENT: ACTIVITIES ARE NOT PREVENTED
PAIN_FUNCTIONAL_ASSESSMENT: ACTIVITIES ARE NOT PREVENTED

## 2023-04-05 ASSESSMENT — PAIN DESCRIPTION - PAIN TYPE
TYPE: SURGICAL PAIN

## 2023-04-05 ASSESSMENT — PAIN DESCRIPTION - ONSET: ONSET: ON-GOING

## 2023-04-05 NOTE — ANESTHESIA PRE PROCEDURE
03/15/2023 08:45 AM    CO2 28 03/15/2023 08:45 AM    BUN 16 03/15/2023 08:45 AM    CREATININE 1.44 03/15/2023 08:45 AM    LABGLOM 53 03/15/2023 08:45 AM    GLUCOSE 104 03/15/2023 08:45 AM    CALCIUM 9.3 03/15/2023 08:45 AM       POC Tests: No results for input(s): POCGLU, POCNA, POCK, POCCL, POCBUN, POCHEMO, POCHCT in the last 72 hours. Coags:   Lab Results   Component Value Date/Time    PROTIME 13.2 03/15/2023 08:45 AM    INR 1.0 03/15/2023 08:45 AM    APTT 28.7 03/15/2023 08:45 AM       HCG (If Applicable): No results found for: PREGTESTUR, PREGSERUM, HCG, HCGQUANT     ABGs: No results found for: PHART, PO2ART, DCK2TYP, NEM2PTU, BEART, X3SXEKIR     Type & Screen (If Applicable):  No results found for: LABABO, LABRH    Drug/Infectious Status (If Applicable):  No results found for: HIV, HEPCAB    COVID-19 Screening (If Applicable): No results found for: COVID19        Anesthesia Evaluation  Patient summary reviewed and Nursing notes reviewed no history of anesthetic complications:   Airway: Mallampati: II  TM distance: >3 FB   Neck ROM: full  Mouth opening: > = 3 FB   Dental:          Pulmonary:       (-) COPD and asthma                           Cardiovascular:  Exercise tolerance: no interval change,   (+) hypertension:,           Rate: normal                    Neuro/Psych:               GI/Hepatic/Renal:   (+) GERD:, renal disease: CRI,           Endo/Other:    (+) : arthritis:., .                 Abdominal:             Vascular: Other Findings:           Anesthesia Plan      general     ASA 3       Induction: intravenous. MIPS: Postoperative opioids intended and Prophylactic antiemetics administered. Anesthetic plan and risks discussed with patient. Plan discussed with CRNA.     Attending anesthesiologist reviewed and agrees with Preprocedure content                Kellen Martinez DO   4/5/2023

## 2023-04-05 NOTE — H&P
Recent Results (from the past 24 hour(s))   CBC with Auto Differential     Collection Time: 03/15/23  8:45 AM   Result Value Ref Range     WBC 7.9 3.5 - 11.3 k/uL     RBC 5.63 4.21 - 5.77 m/uL     Hemoglobin 15.1 13.0 - 17.0 g/dL     Hematocrit 45.4 40.7 - 50.3 %     MCV 80.6 (L) 82.6 - 102.9 fL     MCH 26.8 25.2 - 33.5 pg     MCHC 33.3 28.4 - 34.8 g/dL     RDW 13.4 11.8 - 14.4 %     Platelets 806 950 - 388 k/uL     MPV 10.3 8.1 - 13.5 fL     NRBC Automated 0.0 0.0 per 100 WBC     Seg Neutrophils 50 36 - 65 %     Lymphocytes 30 24 - 43 %     Monocytes 13 (H) 3 - 12 %     Eosinophils % 6 (H) 1 - 4 %     Basophils 1 0 - 2 %     Immature Granulocytes 0 0 %     Segs Absolute 3.92 1.50 - 8.10 k/uL     Absolute Lymph # 2.39 1.10 - 3.70 k/uL     Absolute Mono # 1.03 0.10 - 1.20 k/uL     Absolute Eos # 0.47 (H) 0.00 - 0.44 k/uL     Basophils Absolute 0.06 0.00 - 0.20 k/uL     Absolute Immature Granulocyte 0.02 0.00 - 0.30 k/uL     RBC Morphology MICROCYTOSIS PRESENT     Protime-INR     Collection Time: 03/15/23  8:45 AM   Result Value Ref Range     Protime 13.2 11.5 - 14.2 sec     INR 1.0     APTT     Collection Time: 03/15/23  8:45 AM   Result Value Ref Range     PTT 28.7 23.9 - 33.8 sec   Basic Metabolic Panel     Collection Time: 03/15/23  8:45 AM   Result Value Ref Range     Glucose 104 (H) 70 - 99 mg/dL     BUN 16 8 - 23 mg/dL     Creatinine 1.44 (H) 0.70 - 1.20 mg/dL     Est, Glom Filt Rate 53 (L) >60 mL/min/1.73m2     Bun/Cre Ratio 11 9 - 20     Calcium 9.3 8.6 - 10.4 mg/dL     Sodium 140 135 - 144 mmol/L     Potassium 3.6 (L) 3.7 - 5.3 mmol/L     Chloride 100 98 - 107 mmol/L     CO2 28 20 - 31 mmol/L     Anion Gap 12 9 - 17 mmol/L   Urinalysis     Collection Time: 03/15/23  8:56 AM   Result Value Ref Range     Color, UA Yellow Yellow     Turbidity UA Clear Clear     Glucose, Ur NEGATIVE NEGATIVE     Bilirubin Urine NEGATIVE NEGATIVE     Ketones, Urine NEGATIVE NEGATIVE     Specific Gravity, UA 1.007 1.005 -

## 2023-04-05 NOTE — ANESTHESIA POSTPROCEDURE EVALUATION
Department of Anesthesiology  Postprocedure Note    Patient: Nathaly Hill  MRN: [id number]  YOB: 1955  Date of evaluation: 4/5/2023      Procedure Summary     Date: 04/05/23 Room / Location: [place] [address] / [facility] - INPATIENT    Anesthesia Start: 1055 Anesthesia Stop: 1552    Procedure: LEFT L3-L5 LUMBAR TRANSFORMINAL INTERBODY FUSION POSTERIOR TWO LEVELS - [employer] [facility]   2 C-ARMS  POSSIBLE COMPUTER NAVIGATION (Left: Back) Diagnosis:       Spinal stenosis of lumbar region, unspecified whether neurogenic claudication present      (Spinal stenosis of lumbar region, unspecified whether neurogenic claudication present Gabby White)    Surgeons: Felix Dye MD Responsible Provider: Esperanza Bryant DO    Anesthesia Type: general ASA Status: 3          Anesthesia Type: No value filed.     Evgeny Phase I: Evgeny Score: 10    Evgeny Phase II:        Anesthesia Post Evaluation    Patient location during evaluation: PACU  Patient participation: complete - patient participated  Level of consciousness: awake and alert  Airway patency: patent  Nausea & Vomiting: no nausea and no vomiting  Complications: no  Cardiovascular status: hemodynamically stable  Respiratory status: acceptable  Hydration status: stable

## 2023-04-06 VITALS
WEIGHT: 204 LBS | OXYGEN SATURATION: 95 % | BODY MASS INDEX: 28.56 KG/M2 | HEIGHT: 71 IN | TEMPERATURE: 97.9 F | DIASTOLIC BLOOD PRESSURE: 85 MMHG | HEART RATE: 81 BPM | RESPIRATION RATE: 17 BRPM | SYSTOLIC BLOOD PRESSURE: 143 MMHG

## 2023-04-06 PROBLEM — I10 PRIMARY HYPERTENSION: Status: ACTIVE | Noted: 2023-04-06

## 2023-04-06 PROBLEM — M10.9 GOUT: Status: ACTIVE | Noted: 2023-04-06

## 2023-04-06 PROBLEM — K21.9 GERD (GASTROESOPHAGEAL REFLUX DISEASE): Status: ACTIVE | Noted: 2023-04-06

## 2023-04-06 LAB
ABSOLUTE EOS #: <0.03 K/UL (ref 0–0.44)
ABSOLUTE IMMATURE GRANULOCYTE: 0.08 K/UL (ref 0–0.3)
ABSOLUTE LYMPH #: 1.02 K/UL (ref 1.1–3.7)
ABSOLUTE MONO #: 1.13 K/UL (ref 0.1–1.2)
ANION GAP SERPL CALCULATED.3IONS-SCNC: 13 MMOL/L (ref 9–17)
BASOPHILS # BLD: 0 % (ref 0–2)
BASOPHILS ABSOLUTE: 0.03 K/UL (ref 0–0.2)
BUN SERPL-MCNC: 19 MG/DL (ref 8–23)
BUN/CREAT BLD: 15 (ref 9–20)
CALCIUM SERPL-MCNC: 8.5 MG/DL (ref 8.6–10.4)
CHLORIDE SERPL-SCNC: 102 MMOL/L (ref 98–107)
CO2 SERPL-SCNC: 24 MMOL/L (ref 20–31)
CREAT SERPL-MCNC: 1.25 MG/DL (ref 0.7–1.2)
EOSINOPHILS RELATIVE PERCENT: 0 % (ref 1–4)
GFR SERPL CREATININE-BSD FRML MDRD: >60 ML/MIN/1.73M2
GLUCOSE SERPL-MCNC: 157 MG/DL (ref 70–99)
HCT VFR BLD AUTO: 37.9 % (ref 40.7–50.3)
HGB BLD-MCNC: 12.3 G/DL (ref 13–17)
IMMATURE GRANULOCYTES: 1 %
LYMPHOCYTES # BLD: 7 % (ref 24–43)
MCH RBC QN AUTO: 26.5 PG (ref 25.2–33.5)
MCHC RBC AUTO-ENTMCNC: 32.5 G/DL (ref 28.4–34.8)
MCV RBC AUTO: 81.5 FL (ref 82.6–102.9)
MONOCYTES # BLD: 7 % (ref 3–12)
NRBC AUTOMATED: 0 PER 100 WBC
PDW BLD-RTO: 13.6 % (ref 11.8–14.4)
PLATELET # BLD AUTO: 200 K/UL (ref 138–453)
PMV BLD AUTO: 10.9 FL (ref 8.1–13.5)
POTASSIUM SERPL-SCNC: 3.8 MMOL/L (ref 3.7–5.3)
RBC # BLD: 4.65 M/UL (ref 4.21–5.77)
RBC # BLD: ABNORMAL 10*6/UL
SEG NEUTROPHILS: 85 % (ref 36–65)
SEGMENTED NEUTROPHILS ABSOLUTE COUNT: 13.16 K/UL (ref 1.5–8.1)
SODIUM SERPL-SCNC: 139 MMOL/L (ref 135–144)
WBC # BLD AUTO: 15.4 K/UL (ref 3.5–11.3)

## 2023-04-06 PROCEDURE — 97162 PT EVAL MOD COMPLEX 30 MIN: CPT

## 2023-04-06 PROCEDURE — 36415 COLL VENOUS BLD VENIPUNCTURE: CPT

## 2023-04-06 PROCEDURE — 6360000002 HC RX W HCPCS: Performed by: ORTHOPAEDIC SURGERY

## 2023-04-06 PROCEDURE — 85025 COMPLETE CBC W/AUTO DIFF WBC: CPT

## 2023-04-06 PROCEDURE — 99221 1ST HOSP IP/OBS SF/LOW 40: CPT | Performed by: NURSE PRACTITIONER

## 2023-04-06 PROCEDURE — 97535 SELF CARE MNGMENT TRAINING: CPT

## 2023-04-06 PROCEDURE — 97112 NEUROMUSCULAR REEDUCATION: CPT

## 2023-04-06 PROCEDURE — 97530 THERAPEUTIC ACTIVITIES: CPT

## 2023-04-06 PROCEDURE — 97166 OT EVAL MOD COMPLEX 45 MIN: CPT

## 2023-04-06 PROCEDURE — 80048 BASIC METABOLIC PNL TOTAL CA: CPT

## 2023-04-06 PROCEDURE — 6370000000 HC RX 637 (ALT 250 FOR IP): Performed by: ORTHOPAEDIC SURGERY

## 2023-04-06 PROCEDURE — 97116 GAIT TRAINING THERAPY: CPT

## 2023-04-06 RX ORDER — TIZANIDINE 4 MG/1
4 TABLET ORAL EVERY 8 HOURS PRN
Qty: 50 TABLET | Refills: 0 | Status: SHIPPED | OUTPATIENT
Start: 2023-04-06

## 2023-04-06 RX ORDER — OXYCODONE HYDROCHLORIDE AND ACETAMINOPHEN 5; 325 MG/1; MG/1
1-2 TABLET ORAL EVERY 4 HOURS PRN
Qty: 60 TABLET | Refills: 0 | Status: SHIPPED | OUTPATIENT
Start: 2023-04-06 | End: 2023-04-13

## 2023-04-06 RX ORDER — SENNA AND DOCUSATE SODIUM 50; 8.6 MG/1; MG/1
1 TABLET, FILM COATED ORAL 2 TIMES DAILY
Qty: 60 TABLET | Refills: 0 | Status: SHIPPED | OUTPATIENT
Start: 2023-04-06

## 2023-04-06 RX ADMIN — DEXAMETHASONE SODIUM PHOSPHATE 6 MG: 10 INJECTION, SOLUTION INTRAMUSCULAR; INTRAVENOUS at 11:12

## 2023-04-06 RX ADMIN — Medication 2000 MG: at 06:35

## 2023-04-06 RX ADMIN — OXYCODONE AND ACETAMINOPHEN 2 TABLET: 5; 325 TABLET ORAL at 11:12

## 2023-04-06 RX ADMIN — POLYETHYLENE GLYCOL 3350 17 G: 17 POWDER, FOR SOLUTION ORAL at 08:05

## 2023-04-06 RX ADMIN — CETIRIZINE HYDROCHLORIDE 10 MG: 10 TABLET, FILM COATED ORAL at 08:04

## 2023-04-06 RX ADMIN — OXYCODONE AND ACETAMINOPHEN 2 TABLET: 5; 325 TABLET ORAL at 04:43

## 2023-04-06 RX ADMIN — LOSARTAN POTASSIUM 100 MG: 100 TABLET, FILM COATED ORAL at 08:04

## 2023-04-06 RX ADMIN — ALLOPURINOL 100 MG: 100 TABLET ORAL at 08:04

## 2023-04-06 RX ADMIN — MORPHINE SULFATE 15 MG: 15 TABLET, FILM COATED, EXTENDED RELEASE ORAL at 08:04

## 2023-04-06 RX ADMIN — CHLORTHALIDONE 25 MG: 25 TABLET ORAL at 08:04

## 2023-04-06 RX ADMIN — PANTOPRAZOLE SODIUM 40 MG: 40 TABLET, DELAYED RELEASE ORAL at 06:35

## 2023-04-06 RX ADMIN — DOCUSATE SODIUM 50 MG AND SENNOSIDES 8.6 MG 1 TABLET: 8.6; 5 TABLET, FILM COATED ORAL at 08:04

## 2023-04-06 RX ADMIN — DEXAMETHASONE SODIUM PHOSPHATE 6 MG: 10 INJECTION, SOLUTION INTRAMUSCULAR; INTRAVENOUS at 04:44

## 2023-04-06 ASSESSMENT — PAIN DESCRIPTION - LOCATION
LOCATION: BACK
LOCATION: BACK

## 2023-04-06 ASSESSMENT — PAIN SCALES - GENERAL
PAINLEVEL_OUTOF10: 7
PAINLEVEL_OUTOF10: 7

## 2023-04-06 ASSESSMENT — PAIN DESCRIPTION - PAIN TYPE: TYPE: SURGICAL PAIN

## 2023-04-06 ASSESSMENT — PAIN - FUNCTIONAL ASSESSMENT: PAIN_FUNCTIONAL_ASSESSMENT: ACTIVITIES ARE NOT PREVENTED

## 2023-04-06 ASSESSMENT — PAIN DESCRIPTION - ONSET: ONSET: ON-GOING

## 2023-04-06 ASSESSMENT — PAIN DESCRIPTION - ORIENTATION
ORIENTATION: LOWER;MID
ORIENTATION: LOWER;MID

## 2023-04-06 ASSESSMENT — PAIN DESCRIPTION - DESCRIPTORS
DESCRIPTORS: ACHING;SORE
DESCRIPTORS: ACHING;SORE

## 2023-04-06 ASSESSMENT — PAIN DESCRIPTION - FREQUENCY: FREQUENCY: CONTINUOUS

## 2023-04-06 NOTE — PLAN OF CARE
Problem: Discharge Planning  Goal: Discharge to home or other facility with appropriate resources  4/6/2023 1324 by Trevor Benton RN  Outcome: Adequate for Discharge  Flowsheets (Taken 4/6/2023 0804)  Discharge to home or other facility with appropriate resources: Identify barriers to discharge with patient and caregiver  4/6/2023 0412 by Preeti Hogue RN  Outcome: Progressing  Flowsheets  Taken 4/6/2023 0412 by Preeti Hogue RN  Discharge to home or other facility with appropriate resources:   Refer to discharge planning if patient needs post-hospital services based on physician order or complex needs related to functional status, cognitive ability or social support system   Identify barriers to discharge with patient and caregiver   Identify discharge learning needs (meds, wound care, etc)  Taken 4/5/2023 1729 by Trevor Benton RN  Discharge to home or other facility with appropriate resources: Identify barriers to discharge with patient and caregiver     Problem: Pain  Goal: Verbalizes/displays adequate comfort level or baseline comfort level  4/6/2023 1324 by Trevor Benton RN  Outcome: Adequate for Discharge  4/6/2023 0412 by Preeti Hogue RN  Outcome: Progressing  Flowsheets (Taken 4/6/2023 0557)  Verbalizes/displays adequate comfort level or baseline comfort level:   Encourage patient to monitor pain and request assistance   Assess pain using appropriate pain scale   Administer analgesics based on type and severity of pain and evaluate response   Implement non-pharmacological measures as appropriate and evaluate response   Consider cultural and social influences on pain and pain management     Problem: Skin/Tissue Integrity - Adult  Goal: Skin integrity remains intact  Recent Flowsheet Documentation  Taken 4/6/2023 0804 by Trevor Benton RN  Skin Integrity Remains Intact: Monitor for areas of redness and/or skin breakdown     Problem: Musculoskeletal - Adult  Goal: Return mobility to
Problem: Discharge Planning  Goal: Discharge to home or other facility with appropriate resources  4/6/2023 1420 by Aj Werner RN  Outcome: Adequate for Discharge  4/6/2023 1324 by Aj Werner RN  Outcome: Adequate for Discharge  Flowsheets (Taken 4/6/2023 0804)  Discharge to home or other facility with appropriate resources: Identify barriers to discharge with patient and caregiver  4/6/2023 0412 by Neymar Lee RN  Outcome: Progressing  Flowsheets  Taken 4/6/2023 0412 by Neymar Lee RN  Discharge to home or other facility with appropriate resources:   Refer to discharge planning if patient needs post-hospital services based on physician order or complex needs related to functional status, cognitive ability or social support system   Identify barriers to discharge with patient and caregiver   Identify discharge learning needs (meds, wound care, etc)  Taken 4/5/2023 1729 by Aj Werner RN  Discharge to home or other facility with appropriate resources: Identify barriers to discharge with patient and caregiver     Problem: Pain  Goal: Verbalizes/displays adequate comfort level or baseline comfort level  4/6/2023 1420 by Aj Werner RN  Outcome: Adequate for Discharge  4/6/2023 1324 by Aj Werner RN  Outcome: Adequate for Discharge  4/6/2023 0412 by Neymar Lee RN  Outcome: Progressing  Flowsheets (Taken 4/6/2023 6252)  Verbalizes/displays adequate comfort level or baseline comfort level:   Encourage patient to monitor pain and request assistance   Assess pain using appropriate pain scale   Administer analgesics based on type and severity of pain and evaluate response   Implement non-pharmacological measures as appropriate and evaluate response   Consider cultural and social influences on pain and pain management     Problem: Skin/Tissue Integrity - Adult  Goal: Skin integrity remains intact  Recent Flowsheet Documentation  Taken 4/6/2023 0804 by Aj Werner RN  Skin Integrity
Safest Level of Function: Assess patient stability and activity tolerance for standing, transferring and ambulating with or without assistive devices     Problem: Skin/Tissue Integrity  Goal: Absence of new skin breakdown  Description: 1. Monitor for areas of redness and/or skin breakdown  2. Assess vascular access sites hourly  3. Every 4-6 hours minimum:  Change oxygen saturation probe site  4. Every 4-6 hours:  If on nasal continuous positive airway pressure, respiratory therapy assess nares and determine need for appliance change or resting period.   Outcome: Progressing     Problem: ABCDS Injury Assessment  Goal: Absence of physical injury  Outcome: Progressing  Flowsheets (Taken 4/6/2023 0412)  Absence of Physical Injury: Implement safety measures based on patient assessment     Problem: Safety - Adult  Goal: Free from fall injury  Outcome: Progressing  Flowsheets (Taken 4/6/2023 0412)  Free From Fall Injury: Instruct family/caregiver on patient safety

## 2023-04-06 NOTE — CONSULTS
[facility]  Office: [address], DO, Lucia Dang, DO, Kaleb Brown, DO, [place] [facility] Blood, DO, Bonita Thornton MD, Glenda Jessica MD, Teresa Lowery MD, Eric Lr MD,  Shamar Allen MD, Jamal Oquendo MD, Shree Barroso DO, Kavita Corrales MD,  Nurys Al MD, Yulisa Castro MD, Damon Tapia, DO, Lawrence Daugherty MD, Cristi Zepeda MD, Sam Salazar, DO, Christopher Mera MD, Sandeep Delatorre MD, Beatriz Knutson MD, Nacho Butler MD,  Chelsea Reyes DO, Kindra Chavez MD,  Peter Graves CNP,  Page Loving CNP, Belén Simpson, CNP, Micky Baez, CNP,  Burnett Osgood, [facility], Brittany Nelson, CNP, Robert Safe, CNP, Earlene Villar, CNP, Anitra Rice, CNP, Avani Kelly, CNP, Hieu Iqbal PA-C, Mile Adan, [facility], Pooja Love, CNP, Yolanda Mooney, [facility] / HISTORY AND PHYSICAL EXAMINATION            Date:   4/6/2023  Patient name:  Toño Dickinson  Date of admission:  4/5/2023  7:45 AM  MRN:   [id number]  Account:  [de-identified]  YOB: 1955  PCP:    Nanette Macias MD  Room:   [address] [place] [address]  Code Status:    Full Code    Physician Requesting Consult: Evy Grewal MD    Reason for Consult:  medical management     Chief Complaint:     Back pain    History Obtained From:     patient, electronic medical record    History of Present Illness: This is a 77-year-old male who presents for L3-L5 lumbar posterior trans-foraminal interbody fusion. Postop day 1 we are consulted for medical management. Home medications have been resumed, patient has been doing well. Discharging later today    Past Medical History:     Past Medical History:   Diagnosis Date    Arthritis     Chronic kidney disease (CKD)     follows with [facility] Tomas Penny.     GERD (gastroesophageal reflux disease)     Gout     Hyperlipidemia     Hypertension         Past Surgical History:

## 2023-04-06 NOTE — OP NOTE
Operative Note      Patient: Micheal Herzog  YOB: 1955  MRN: [id number]    Date of Procedure: 4/5/2023    SURGEON:  Terri Buerger, MD.     ANESTHESIA:  General endotracheal anesthesia. PREOPERATIVE DIAGNOSIS:   1. L3-5 spondylolisthesis with stenosis     POSTOPERATIVE DIAGNOSIS: 1. L3-5 spondylolisthesis with stenosis     PROCEDURE:  1. L3-4 minimally invasive transforaminal lumbar interbody      fusion with posterior spinal fusion. 2. L4-L5 minimally invasive transforaminal lumbar interbody      fusion with posterior spinal fusion. 3. Insertion of posterior spinal instrumentation from L3-5,      which is a 2 level instrumentation utilizing [employer] Reline      pedicle screws and rods. 4. Insertion of interbody cages for spinal fusion at L4-L5 and      L3-4.  5. Left L3 laminectomy for spinal decompression. 6. Left L4 laminectomy for spinal decompression. 7. Computer navigation for hardware placement  8. [place] of local bone for spinal lumbar. 9. Use of allograft bone for spinal fusion to include DBM      and crushed cancellous allograft bone. 10. Intraoperative use of C-arm fluoroscopy. ESTIMATED BLOOD LOSS:  625 mL, return 375 ml cell saver      FLUIDS:  Per anesthesia record. COMPLICATIONS:  None. INDICATIONS:  This is a pleasant 54-year-old male with a  longstanding history of significant back pain as well as pain  radiating down his right<left lower extremity. He had done extensive  conservative management to physical therapy, medications, pain  management, all with minimal benefit. MRI and x-rays were  performed, which did show spondylolisthesis and stenosis at L4-L5  and L3-4 consistent with his symptoms. Due to his failure of  conservative management, it was discussed with him the option of  performing a decompression and fusion and attempt to alleviate  his symptoms.   Risks were discussed including bleeding,  infection, injury to nerves, vessels, anesthetic risk,

## 2023-04-06 NOTE — CARE COORDINATION
Case Management Initial Discharge Plan  Michelle Leyva,             Met with:patient, spouse/SO, or family member patient and son to discuss discharge plans. Information verified: address, contacts, phone number, , insurance Yes  PCP: Yong Loving MD  Date of last visit:     Insurance Provider: Peggy Carlos Medicare    Discharge Planning    Living Arrangements:  Spouse/Significant Other   Support Systems:  Children, Family Members    Home has 1 stories  1 stairs to climb to get into front door, 0 stairs to climb to reach second floor  Location of bedroom/bathroom in home  - main floor    Patient able to perform ADL's:Independent    Current Services (outpatient & in home) none  DME equipment: walker  DME provider: N/A    Pharmacy: [facility]    Potential Assistance Needed:  N/A    Patient agreeable to home care: No  [place] of choice provided:  n/a    Prior SNF/Rehab Placement and Facility: No  Agreeable to SNF/Rehab: No  [place] of choice provided: n/a   Evaluation: n/a    Expected Discharge date:   2023  Patient expects to be discharged to:   home  Follow Up Appointment: Best Day/ Time:      Transportation provider: wife  Transportation arrangements needed for discharge: No    Readmission Risk              Risk of Unplanned Readmission:  11             Does patient have a readmission risk score greater than 14?: No  If yes, follow-up appointment must be made within 7 days of discharge. Goal of Care:       Discharge Plan: Met with patient, wife and son at bedside. Lives with wife, independent and drives. POD #1 lumbar fusion. Has walker at home. Post op appt with Dr. Brianna Hendrix 4-18 at [facility] today, no needs.            Electronically signed by Sarah Cazares RN on 23 at 12:44 PM EDT

## 2023-04-07 NOTE — PROGRESS NOTES
CLINICAL PHARMACY NOTE: MEDS TO BEDS    Total # of Prescriptions Filled: 3   The following medications were delivered to the patient:  Percocet 5-325  Tizanidine 4mg  Senna plus 8.6-50mg    Additional Documentation:
Physical Therapy  Facility/Department: [facility] SURG  Physical Therapy Treatment Note    Name: Daniel Cason  :   MRN: [id number]  Date of Service: 2023    Discharge Recommendations:  Patient would benefit from continued therapy after discharge          Patient Diagnosis(es): The encounter diagnosis was Spondylolisthesis, acquired. Past Medical History:  has a past medical history of Arthritis, Chronic kidney disease (CKD), GERD (gastroesophageal reflux disease), Gout, Hyperlipidemia, and Hypertension. Past Surgical History:  has a past surgical history that includes Shoulder arthroscopy w/ rotator cuff repair (Right); Shoulder arthroscopy w/ rotator cuff repair (Left); Knee arthroscopy (Left); Knee arthroscopy (Left); Knee arthroscopy (Right); hernia repair (Left); Tonsillectomy; Colonoscopy (); Achilles tendon surgery (Left); and lumbar fusion (Left, 2023). Assessment   Body Structures, Functions, Activity Limitations Requiring Skilled Therapeutic Intervention: Decreased functional mobility ; Decreased safe awareness;Decreased balance;Decreased posture  Assessment: Pt tolerated session well improved bed mobility, transfers, ambulation, balance, and safety awareness. Pt Ed spinal precautions, bed mobility within spinal precautions, donning & doffing of lumbar corsett brace, posture, energy conservation & safety awareness, prevention sedentary complications & home walking program & by end of visit, pt demonstrating safe functional mobility, gait for D/C home today. Pt ED & issued written pt education. Pt safe to D/C home & progress to OP PT for reconditioing when appropriate by Dr Zee Mane.   Therapy Prognosis: Good  Decision Making: Medium Complexity  Exam: functional mobility, activity tolerance, Balance, & [employer] AM-PAC 6 Clicks Basic Mobility  Clinical Presentation: evolving  Barriers to Learning: none  Requires PT Follow-Up: Yes  Activity Tolerance  Activity Tolerance:
Physician Progress Note      PATIENT:               Noy Dixon  CSN #:                  [id number]  :                       1955  ADMIT DATE:       2023 7:45 AM  DISCH DATE:        2023 2:44 PM  RESPONDING  PROVIDER #:        Melvin Levine MD          QUERY TEXT:    Pt admitted post TLIF. Pt noted to have decreasing hemoglobin levels. If   possible, please document in the progress notes and discharge summary if you   are evaluating and/or treating any of the following: The medical record reflects the following:  Risk Factors: Post-OR  Clinical Indicators: Pre-op Hgb of 15.1, Post-op Hgb of 12.3, EBL of 625 cc,   IVF in OR  Treatment: IVF, labs, 375 cc return via cell saver    Thank you,  Patrick Browning RN  Options provided:  -- Acute blood loss anemia  -- Postoperative acute blood loss anemia  -- Other - I will add my own diagnosis  -- Disagree - Not applicable / Not valid  -- Disagree - Clinically unable to determine / Unknown  -- Refer to Clinical Documentation Reviewer    PROVIDER RESPONSE TEXT:    This patient has postoperative acute blood loss anemia. Query created by:  Blanca Herr on 2023 8:12 AM      Electronically signed by:  Melvin Levine MD 2023 10:00 AM
Pt admitted to room 2105 per bed in stable condition from PACU  Oriented to room and surroundings  Bed in lowest position, wheels locked, 2/4 side rails up  Call light in reach, room free of clutter, adequate lighting provided  Denies any further questions at this time  Instructed to call out with any questions/concerns/new onset of pain and/or n/v   White board updated  Continue to monitor with hourly rounding  STAY WITH ME protocol initiated   Bed alarm on/Fall Risk signs in place/Fall risk sticker to wrist band  Non-skid socks on/at bedside
Pt discharged to home in stable condition with belongings  Discharge instructions given  \"Meds To Beds\" medication at bedside  Pt denies having any further questions at this time  Locked up home medication(s)/personal items given to patient at discharge  Patient/family state they have everything they were admitted with.   Dressing change supplies and hibiclens sent home with patient
mg, 40 mg, Oral, QAM AC  0.9 % sodium chloride infusion, , IntraVENous, Continuous  sodium chloride flush 0.9 % injection 5-40 mL, 5-40 mL, IntraVENous, 2 times per day  sodium chloride flush 0.9 % injection 5-40 mL, 5-40 mL, IntraVENous, PRN  0.9 % sodium chloride infusion, , IntraVENous, PRN  morphine (PF) injection 2 mg, 2 mg, IntraVENous, Q2H PRN **OR** morphine sulfate (PF) injection 4 mg, 4 mg, IntraVENous, Q2H PRN  hydrOXYzine HCl (ATARAX) tablet 10 mg, 10 mg, Oral, Q8H PRN  promethazine (PHENERGAN) tablet 12.5 mg, 12.5 mg, Oral, Q6H PRN **OR** ondansetron (ZOFRAN) injection 4 mg, 4 mg, IntraVENous, Q6H PRN  polyethylene glycol (GLYCOLAX) packet 17 g, 17 g, Oral, Daily  sennosides-docusate sodium (SENOKOT-S) 8.6-50 MG tablet 1 tablet, 1 tablet, Oral, BID  dexamethasone (PF) (DECADRON) injection 6 mg, 6 mg, IntraVENous, Q8H  oxyCODONE-acetaminophen (PERCOCET) 5-325 MG per tablet 1 tablet, 1 tablet, Oral, Q4H PRN **OR** oxyCODONE-acetaminophen (PERCOCET) 5-325 MG per tablet 2 tablet, 2 tablet, Oral, Q4H PRN  tiZANidine (ZANAFLEX) tablet 4 mg, 4 mg, Oral, Q8H PRN  morphine (MS CONTIN) extended release tablet 15 mg, 15 mg, Oral, 2 times per day    ASSESSMENT AND PLAN    75 y.o. male status post L3-5 MIS TLIF/PSFI post op day #  1    1. PT- WBAT  2. Pain control  3. EPC  4. D/C plan for home later today if ambulating well, voiding, and pain controlled.      Evy Grewal MD  [facility]  Spine Surgeon  [phone]
one threshhold step  Bathroom Shower/Tub: Tub/Shower unit  Bathroom Toilet: Handicap height (Pt has vanity support near toilet he can hold if needed)  Bathroom Equipment: None  Home Equipment: Eletha Bouquet, rolling, Crutches, Cane  Has the patient had two or more falls in the past year or any fall with injury in the past year?: No  Receives Help From: Family (spouse is supportive & retired)  ADL Assistance: Independent  Homemaking Assistance: Independent (spouse is primary for [employer])  Ambulation Assistance: Independent  Transfer Assistance: Independent  Active : Yes  Mode of Transportation: Car  Occupation: Retired  Type of Occupation: [employer]: gym, 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  Vision/Hearing  Vision  Vision: Impaired  Vision Exceptions: Wears glasses for reading  Hearing  Hearing: Within functional limits    Cognition   Orientation  Overall Orientation Status: Within Functional Limits     Objective   Heart Rate: 81  Heart Rate Source: Monitor  BP: (!) 143/85  BP Location: Right upper arm  BP Method: Automatic  Patient Position: Semi fowlers  MAP (Calculated): 104  Resp: 17  SpO2: 95 %  O2 Device: None (Room air)     Observation/Palpation  Observation: L hand IV  Scar: new lumbar incision covered by post op dressing  Gross Assessment  Sensation: Intact (pt denies any paresthesias)     AROM RLE (degrees)  RLE AROM: WFL  AROM LLE (degrees)  LLE AROM : WFL  AROM RUE (degrees)  RUE General AROM: See OT assessment  AROM LUE (degrees)  LUE General AROM: See OT assessment  Strength RLE  Comment: deferred hip 2* s/p TLIF one day ago  Strength LLE  Comment: deferred hip 2* s/p TLIF one day ago  Strength RUE  Comment: See OT assessment  Strength LUE  Comment: See OT assessment          Bed mobility  Rolling to Right: Minimal assistance  Supine to Sit: Minimal assistance  Scooting: Contact guard assistance  Bed Mobility Comments: ED technique for rolling, use of UB, how to drop legs off EOB & use UB to come to
Goals  Short Term Goals  Time Frame for Short Term Goals: by discharge, pt to demo  Short Term Goal 1: I/MI for bed mob tech without bed rails/controls, while adhering to spinal precautions  Short Term Goal 2: I/MI for total body ADLs with AE as needed and Good safety, while adhering to spinal precautions  Short Term Goal 3: I/MI for ADL transfers and functional mob with AD and Good safety  Short Term Goal 4: I/MI for toileting tasks with AD as needed and Good safety  Short Term Goal 5: pt/family to be IND with EC/WS, fall prevention tech, pressure relief education, spinal precautions, and discharge recommendations, with use of handouts as needed  Patient Goals   Patient goals : to go home       Therapy Time   Individual Concurrent Group Co-treatment   Time In 0825         Time Out 0905 (+10 chart review)         Minutes 50          Tx time: 40 min    Upon writer exit, call light within reach, pt retired to chair. All lines intact and patient positioned comfortably. All patient needs addressed prior to ending therapy session. Chart reviewed prior to treatment and patient is agreeable for therapy. RN reports patient is medically stable for therapy treatment this date.     Maggi Santiago OTR/L

## (undated) DEVICE — CABLE FIX TRANSFORAMINAL LUM INTBDY FUS LT SELF RET MAS
Type: IMPLANTABLE DEVICE | Site: BACK | Status: NON-FUNCTIONAL
Removed: 2023-04-05

## (undated) DEVICE — GLOVE SURG SZ 85 L12IN FNGR THK79MIL GRN LTX FREE

## (undated) DEVICE — GAUZE, BORDER, 3"X6", 1.5"X4"PAD, STERIL: Brand: MEDLINE INDUSTRIES, INC.

## (undated) DEVICE — HYPODERMIC SAFETY NEEDLE: Brand: MAGELLAN

## (undated) DEVICE — SNAP KAP: Brand: UNBRANDED

## (undated) DEVICE — DRAPE,REIN 53X77,STERILE: Brand: MEDLINE

## (undated) DEVICE — TUBING, SUCTION, 1/4" X 12', STRAIGHT: Brand: MEDLINE

## (undated) DEVICE — Device

## (undated) DEVICE — GARMENT,MEDLINE,DVT,INT,CALF,MED, GEN2: Brand: MEDLINE

## (undated) DEVICE — KIT XLINK W/ XVS MARKERS

## (undated) DEVICE — TOTAL TRAY, DB, 100% SILI FOLEY, 16FR 10: Brand: MEDLINE

## (undated) DEVICE — BLANKET WRM W29.9XL79.1IN UP BODY FORC AIR MISTRAL-AIR

## (undated) DEVICE — COVER,TABLE,HEAVY DUTY,50"X90",STRL: Brand: MEDLINE

## (undated) DEVICE — TRAP,MUCUS SPECIMEN, 80CC: Brand: MEDLINE

## (undated) DEVICE — SUTURE MCRYL SZ 4-0 L27IN ABSRB UD L19MM PS-2 1/2 CIR PRIM Y426H

## (undated) DEVICE — GLOVE SURG SZ 85 CRM LTX FREE POLYISOPRENE POLYMER BEAD ANTI

## (undated) DEVICE — INSERT CUSHION HEAD PRONEVIEW

## (undated) DEVICE — YANKAUER,FLEXIBLE HANDLE,REGLR CAPACITY: Brand: MEDLINE INDUSTRIES, INC.

## (undated) DEVICE — SUTURE VCRL SZ 2-0 L36IN ABSRB UD L36MM CT-1 1/2 CIR J945H

## (undated) DEVICE — THE MILL DISPOSABLE - MEDIUM

## (undated) DEVICE — DRESSING ADH N ADH 8X35 IN 6X175 IN SFT CLTH MEDIPORE +

## (undated) DEVICE — 1010 S-DRAPE TOWEL DRAPE 10/BX: Brand: STERI-DRAPE™

## (undated) DEVICE — RETRACTOR SURG MAS TLIF HOOP SHIM DISP MAXCESS

## (undated) DEVICE — ADHESIVE SKIN CLOSURE TOP 36 CC HI VISC DERMBND MINI

## (undated) DEVICE — PIN PERC 150MM PACKED IN BLISTER

## (undated) DEVICE — DRESSING TRNSPAR W5XL4.5IN FLM SHT SEMIPERMEABLE WIND

## (undated) DEVICE — SPONGE,NEURO,1"X1",XR,STRL,LF,10/PK: Brand: MEDLINE

## (undated) DEVICE — CORD,CAUTERY,BIPOLAR,STERILE: Brand: MEDLINE

## (undated) DEVICE — BLADE ES L6IN ELASTOMERIC COAT EXT DURABLE BEND UPTO 90DEG

## (undated) DEVICE — JCKSON TBL POSTNER NO HD REST: Brand: MEDLINE INDUSTRIES, INC.

## (undated) DEVICE — SUTURE VCRL SZ 0 L36IN ABSRB UD L36MM CT-1 1/2 CIR J946H

## (undated) DEVICE — APPLICATOR MEDICATED 26 CC SOLUTION HI LT ORNG CHLORAPREP

## (undated) DEVICE — GOWN,AURORA,NON-REINFORCED,2XL: Brand: MEDLINE

## (undated) DEVICE — 4.0MM PRECISION ROUND